# Patient Record
Sex: FEMALE | Race: WHITE
[De-identification: names, ages, dates, MRNs, and addresses within clinical notes are randomized per-mention and may not be internally consistent; named-entity substitution may affect disease eponyms.]

---

## 2019-02-12 NOTE — HP
DATE OF SURGERY:  02/14/2019 

 

ANTICIPATED PROCEDURE:  PEG tube. 



HISTORY OF PRESENT ILLNESS: Patient has inability to maintain nutrition. 

 

PAST MEDICAL HISTORY:  

ALLERGIES:  PER THE CHART.

MEDICATIONS: Per the chart. 



PAST SURGICAL HISTORY:  No recent surgery. 



SOCIAL HISTORY: Negative. Resides in nursing home. 

FAMILY HISTORY: Negative.



REVIEW OF SYSTEMS: CVS: Negative. Pulmonary: Negative. 



PHYSICAL EXAMINATION:  

VITAL SIGNS: Normal.

CHEST:  Clear.

COR: Regular.

ABDOMEN:  Satisfactory.



IMPRESSION: Patient is a satisfactory candidate for PEG tube. She has inability to 
maintain nutrition. 



PLAN:  PEG tube.

## 2019-02-14 ENCOUNTER — HOSPITAL ENCOUNTER (OUTPATIENT)
Dept: HOSPITAL 33 - SDC | Age: 84
Discharge: HOME | End: 2019-02-14
Attending: SURGERY
Payer: MEDICARE

## 2019-02-14 VITALS — SYSTOLIC BLOOD PRESSURE: 157 MMHG | DIASTOLIC BLOOD PRESSURE: 67 MMHG | OXYGEN SATURATION: 94 % | HEART RATE: 97 BPM

## 2019-02-14 DIAGNOSIS — Z43.1: Primary | ICD-10-CM

## 2019-02-15 NOTE — OP
SURGERY DATE/TIME:   02/14/2019  0930



PREOPERATIVE DIAGNOSIS:    Inability to maintain nutrition. 



POSTOPERATIVE DIAGNOSIS: Inability to maintain nutrition. 



PROCEDURE:   PEG tube placement.



SURGEON:        Bal Ricketts M.D.



ANESTHESIA:    IV sedation 15 minutes. 



COMPLICATIONS:    None.



CONDITION:        Stable.



INDICATION: A patient requiring PEG tube, inability to maintain nutrition.     



DESCRIPTION OF PROCEDURE: Taken to endoscopy. Dorsal position. Time out performed. 
Sedation titrated. Oximetry kept over 90%. Comfort level satisfactory. Pharyngoesophageal 
junction cannulated. There was some hiatal hernia and the stomach was a little high. It 
was seen in the epigastrium left subcostal area. The left upper subcostal area was chosen. 
It was prepped. It was anesthetized. The Angiocath was pushed through. There did not seem 
to be any inner lying material. The wire was placed. The wire was pulled through. Tube was 
pulled through and seated at 2 cm. The patient tolerated the procedure satisfactorily. 
Findings discussed with the  in the waiting room.